# Patient Record
Sex: MALE | Race: AMERICAN INDIAN OR ALASKA NATIVE
[De-identification: names, ages, dates, MRNs, and addresses within clinical notes are randomized per-mention and may not be internally consistent; named-entity substitution may affect disease eponyms.]

---

## 2020-10-22 ENCOUNTER — HOSPITAL ENCOUNTER (OUTPATIENT)
Dept: HOSPITAL 5 - MAMMO | Age: 70
Discharge: HOME | End: 2020-10-22
Attending: UROLOGY
Payer: MEDICARE

## 2020-10-22 DIAGNOSIS — M81.0: Primary | ICD-10-CM

## 2020-10-22 DIAGNOSIS — C61: ICD-10-CM

## 2020-10-22 PROCEDURE — 77080 DXA BONE DENSITY AXIAL: CPT

## 2020-10-22 NOTE — MAMMOGRAPHY REPORT
DEXA BONE DENSITY SCAN



INDICATION: 

Osteoporosis evaluation in a patient with history of prostate cancer



COMPARISON: 

None



LUMBAR SPINE (L1-L4):

Bone mineral density (BMD) is 1.3 g/cm2.

T-score is 1.9 (standard deviations of Young Adult mean).

Z-score is 1.9 (standard deviations of Age Matched mean).







LEFT FEMORAL NECK:

Bone mineral density (BMD) is 1.169 g/cm2.

T-score is 0.9 (standard deviations of Young Adult mean).

Z-score is 0.9 (standard deviations of Age Matched mean).







IMPRESSION:

1. WHO Classification: Normal bone density. Fracture Risk: Not Increased. 



Signer Name: Emil Melton MD 

Signed: 10/22/2020 11:19 AM

Workstation Name: [id number]